# Patient Record
Sex: FEMALE | Race: BLACK OR AFRICAN AMERICAN | NOT HISPANIC OR LATINO | Employment: FULL TIME | URBAN - METROPOLITAN AREA
[De-identification: names, ages, dates, MRNs, and addresses within clinical notes are randomized per-mention and may not be internally consistent; named-entity substitution may affect disease eponyms.]

---

## 2020-11-09 ENCOUNTER — OFFICE VISIT (OUTPATIENT)
Dept: PODIATRY | Facility: CLINIC | Age: 35
End: 2020-11-09
Payer: COMMERCIAL

## 2020-11-09 ENCOUNTER — HOSPITAL ENCOUNTER (OUTPATIENT)
Dept: RADIOLOGY | Facility: HOSPITAL | Age: 35
Discharge: HOME | End: 2020-11-09
Attending: PODIATRIST
Payer: COMMERCIAL

## 2020-11-09 VITALS
SYSTOLIC BLOOD PRESSURE: 150 MMHG | HEART RATE: 90 BPM | TEMPERATURE: 98 F | RESPIRATION RATE: 18 BRPM | BODY MASS INDEX: 40.75 KG/M2 | WEIGHT: 230 LBS | DIASTOLIC BLOOD PRESSURE: 100 MMHG | HEIGHT: 63 IN

## 2020-11-09 DIAGNOSIS — M20.41 HAMMER TOE OF RIGHT FOOT: ICD-10-CM

## 2020-11-09 DIAGNOSIS — M20.41 HAMMER TOE OF RIGHT FOOT: Primary | ICD-10-CM

## 2020-11-09 PROCEDURE — 99203 PR OFFICE/OUTPT VISIT, NEW, LEVL III, 30-44 MIN: ICD-10-PCS | Mod: S$GLB,,, | Performed by: PODIATRIST

## 2020-11-09 PROCEDURE — 99999 PR PBB SHADOW E&M-NEW PATIENT-LVL IV: CPT | Mod: PBBFAC,,, | Performed by: PODIATRIST

## 2020-11-09 PROCEDURE — 73630 X-RAY EXAM OF FOOT: CPT | Mod: 26,RT,, | Performed by: RADIOLOGY

## 2020-11-09 PROCEDURE — 73630 XR FOOT COMPLETE 3 VIEW RIGHT: ICD-10-PCS | Mod: 26,RT,, | Performed by: RADIOLOGY

## 2020-11-09 PROCEDURE — 73630 X-RAY EXAM OF FOOT: CPT | Mod: TC,FY,RT

## 2020-11-09 PROCEDURE — 99999 PR PBB SHADOW E&M-NEW PATIENT-LVL IV: ICD-10-PCS | Mod: PBBFAC,,, | Performed by: PODIATRIST

## 2020-11-09 PROCEDURE — 3008F BODY MASS INDEX DOCD: CPT | Mod: S$GLB,,, | Performed by: PODIATRIST

## 2020-11-09 PROCEDURE — 99203 OFFICE O/P NEW LOW 30 MIN: CPT | Mod: S$GLB,,, | Performed by: PODIATRIST

## 2020-11-09 PROCEDURE — 3008F PR BODY MASS INDEX (BMI) DOCUMENTED: ICD-10-PCS | Mod: S$GLB,,, | Performed by: PODIATRIST

## 2020-11-09 RX ORDER — AMLODIPINE BESYLATE 10 MG/1
10 TABLET ORAL DAILY
COMMUNITY

## 2020-11-09 RX ORDER — ASPIRIN 325 MG
50000 TABLET, DELAYED RELEASE (ENTERIC COATED) ORAL
COMMUNITY
Start: 2020-08-26

## 2020-11-09 RX ORDER — FERROUS GLUCONATE 324(38)MG
324 TABLET ORAL
COMMUNITY

## 2020-11-09 RX ORDER — ALBUTEROL SULFATE 5 MG/ML
2.5 SOLUTION RESPIRATORY (INHALATION) EVERY 6 HOURS PRN
COMMUNITY

## 2020-11-09 RX ORDER — IBUPROFEN 800 MG/1
800 TABLET ORAL 3 TIMES DAILY PRN
COMMUNITY
Start: 2020-11-02

## 2020-11-09 NOTE — LETTER
November 10, 2020      Mario Fisher MD  [address] [address]  [address]  [place] [address]           [facility]  [address] [place] [address]  Phone: [phone]  Fax: [phone]          Patient: Penelope Portillo   MR Number: [id number]   YOB: 1985   Date of Visit: 11/9/2020       Dear Dr. Mario Fisher:    Thank you for referring Penelope Portillo to me for evaluation. Attached you will find relevant portions of my assessment and plan of care.    If you have questions, please do not hesitate to call me. I look forward to following Penelope Portillo along with you.    Sincerely,    Matheus Chauhan, SALTY    Enclosure  CC:  No Recipients    If you would like to receive this communication electronically, please contact [email or web address] or [phone] to request more information on [employer] Link access.    For providers and/or their staff who would like to refer a patient to [facility], please contact us through our one-stop-shop provider referral line, [facility], at [phone].    If you feel you have received this communication in error or would no longer like to receive these types of communications, please e-mail [email or web address]

## 2020-11-10 NOTE — PROGRESS NOTES
Subjective:       Patient ID: Peenlope Portillo is a 35 y.o. female.    Chief Complaint: Hammer Toe (right)   Patient presents today as a referral from primary care for a painful 3rd digit right foot.  Patient states it throbs and become swollen this has been bothering her for about a year and has gotten progressively worse she relates no trauma or injury to the area.    Past Medical History:   Diagnosis Date    Asthma     Hypertension      Past Surgical History:   Procedure Laterality Date    MYOMECTOMY       Family History   Problem Relation Age of Onset    Asthma Mother      Social History     Socioeconomic History    Marital status: Single     Spouse name: Not on file    Number of children: Not on file    Years of education: Not on file    Highest education level: Not on file   Occupational History    Not on file   Social Needs    Financial resource strain: Not on file    Food insecurity     Worry: Not on file     Inability: Not on file    Transportation needs     Medical: Not on file     Non-medical: Not on file   Tobacco Use    Smoking status: Never Smoker    Smokeless tobacco: Never Used   Substance and Sexual Activity    Alcohol use: Yes     Frequency: 2-4 times a month    Drug use: Never    Sexual activity: Yes     Partners: Male   Lifestyle    Physical activity     Days per week: Not on file     Minutes per session: Not on file    Stress: Not on file   Relationships    Social connections     Talks on phone: Not on file     Gets together: Not on file     Attends Cheondoism service: Not on file     Active member of club or organization: Not on file     Attends meetings of clubs or organizations: Not on file     Relationship status: Not on file   Other Topics Concern    Not on file   Social History Narrative    Not on file       Current Outpatient Medications   Medication Sig Dispense Refill    albuterol (PROVENTIL) 5 mg/mL nebulizer solution Take 2.5 mg by nebulization every 6 (six) hours  "as needed for Wheezing. Rescue      amLODIPine (NORVASC) 10 MG tablet Take 10 mg by mouth once daily.      ferrous gluconate (FERGON) 324 MG tablet Take 324 mg by mouth daily with breakfast.      cholecalciferol, vitamin D3, 1,250 mcg (50,000 unit) capsule Take 50,000 Units by mouth every 7 days.      ibuprofen (ADVIL,MOTRIN) 800 MG tablet Take 800 mg by mouth 3 (three) times daily as needed.       No current facility-administered medications for this visit.      Review of patient's allergies indicates:  No Known Allergies    Review of Systems   Musculoskeletal: Positive for arthralgias and joint swelling.   All other systems reviewed and are negative.      Objective:      Vitals:    11/09/20 0813   BP: (!) 150/100   BP Location: Left arm   Patient Position: Sitting   Pulse: 90   Resp: 18   Temp: 98.1 °F (36.7 °C)   TempSrc: Oral   Weight: 104.3 kg (230 lb)   Height: 5' 3" (1.6 m)     Physical Exam  Vitals signs and nursing note reviewed.   Constitutional:       Appearance: Normal appearance.   Cardiovascular:      Pulses:           Dorsalis pedis pulses are 2+ on the right side and 2+ on the left side.        Posterior tibial pulses are 2+ on the right side and 2+ on the left side.   Pulmonary:      Effort: Pulmonary effort is normal.   Musculoskeletal:         General: Swelling, tenderness and deformity present.      Right foot: Decreased range of motion. Deformity present.        Feet:    Feet:      Right foot:      Protective Sensation: 2 sites tested. 2 sites sensed.      Left foot:      Protective Sensation: 2 sites tested. 2 sites sensed.   Skin:     General: Skin is warm.      Capillary Refill: Capillary refill takes less than 2 seconds.   Neurological:      General: No focal deficit present.      Mental Status: She is alert.   Psychiatric:         Mood and Affect: Mood normal.         Behavior: Behavior normal.         Thought Content: Thought content normal.         Judgment: Judgment normal. "                      Assessment:       1. Hammer toe of right foot        Plan:       Patient presents today as a referral from primary care for a painful 3rd digit right foot.  Patient states it throbs and become swollen this has been bothering her for about a year and has gotten progressively worse she relates no trauma or injury to the area.  On evaluation today x-rays were evaluated of the patient's right foot patient is noted have digital contractures however the most severely noted is the distal interphalangeal joint 3rd digit right this is noted to be grossly contracted while not fractured nor dislocated the joint itself is contracted.  On manipulation patient's digital contracture at the distal 3rd digit right is noted to be rigid in nature non reducible the majority of the deformity was at the distal interphalangeal joint the proximal interphalangeal joint was reducible upon manipulation there is also some degree of arthritic changes in the distal interphalangeal joint 3rd digit right.  Patient advised there is some degree of digital contracture in the other digits noted on plain film x-ray however not as severe as the 3rd digit obviously this is causing the patient significant discomfort because of the contracture when she walks she is putting pressure on the tip of the 3rd digit rather than the underside or plantar aspect of the 3rd digit.  I did advised the patient what would need to be done surgically to address this she indicates this has been bothering for her for year she needs to do something about it I did discuss an arthrodesis of the distal and proximal interphalangeal joint 3rd digit advising her this was be an outpatient procedure with nonweightbearing for a couple of days followed by weight-bearing in a fracture boot for likely 3-4 weeks while the area is healing patient was in understanding and agreement with this all aspects of surgical intervention discussed patient advised to think about  this carefully obviously there is no rush in doing this whether it is done our year from now it will likely be the same procedure it just depends on how much it is bothering her and causing her discomfort at this point.  Patient was advised this will likely progressively get worse over time.  Patient is going to consider surgical options discussed today there were really no good conservative options for the patient she has tried different types of pads and she states things seem to just irritate this area worse.  Follow-up as needed patient advised to contact us with any questions problems concerns or should she further wish to discuss surgical intervention.  Face-to-face time including discussion evaluation treatment options surgical options review of x-rays equaled 30 min.This note was created using [employer] voice recognition software that occasionally misinterpreted phrases or words.

## 2020-11-24 ENCOUNTER — TELEPHONE (OUTPATIENT)
Dept: PODIATRY | Facility: CLINIC | Age: 35
End: 2020-11-24

## 2020-11-24 DIAGNOSIS — M20.41 HAMMER TOE OF RIGHT FOOT: Primary | ICD-10-CM

## 2020-11-24 NOTE — TELEPHONE ENCOUNTER
Patient would like to schedule surgery 12/4/2020 due to her being off work until 12/30/2020 and this would allow her time to heal from surgery. 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 If so I will schedule pre op on 11/30/2020

## 2020-11-24 NOTE — TELEPHONE ENCOUNTER
----- Message from Elijah Ramos sent at 11/24/2020 10:42 AM CST -----  Contact: pt at  [phone]  Type: Needs Medical Advice  Who Called:  pt  Best Call Back Number: [phone]  Additional Information: pt is calling to schedule surgery. Please call back and advise

## 2020-11-30 ENCOUNTER — OFFICE VISIT (OUTPATIENT)
Dept: PODIATRY | Facility: CLINIC | Age: 35
End: 2020-11-30
Payer: COMMERCIAL

## 2020-11-30 VITALS
WEIGHT: 227 LBS | HEART RATE: 93 BPM | BODY MASS INDEX: 40.22 KG/M2 | DIASTOLIC BLOOD PRESSURE: 96 MMHG | SYSTOLIC BLOOD PRESSURE: 137 MMHG | TEMPERATURE: 98 F | HEIGHT: 63 IN

## 2020-11-30 DIAGNOSIS — Z01.818 PRE-OP EXAM: ICD-10-CM

## 2020-11-30 DIAGNOSIS — M20.41 HAMMER TOE OF RIGHT FOOT: Primary | ICD-10-CM

## 2020-11-30 PROCEDURE — 3008F BODY MASS INDEX DOCD: CPT | Mod: S$GLB,,, | Performed by: PODIATRIST

## 2020-11-30 PROCEDURE — 99999 PR PBB SHADOW E&M-EST. PATIENT-LVL IV: CPT | Mod: PBBFAC,,, | Performed by: PODIATRIST

## 2020-11-30 PROCEDURE — 99214 PR OFFICE/OUTPT VISIT, EST, LEVL IV, 30-39 MIN: ICD-10-PCS | Mod: S$GLB,,, | Performed by: PODIATRIST

## 2020-11-30 PROCEDURE — 1126F AMNT PAIN NOTED NONE PRSNT: CPT | Mod: S$GLB,,, | Performed by: PODIATRIST

## 2020-11-30 PROCEDURE — 1126F PR PAIN SEVERITY QUANTIFIED, NO PAIN PRESENT: ICD-10-PCS | Mod: S$GLB,,, | Performed by: PODIATRIST

## 2020-11-30 PROCEDURE — 99999 PR PBB SHADOW E&M-EST. PATIENT-LVL IV: ICD-10-PCS | Mod: PBBFAC,,, | Performed by: PODIATRIST

## 2020-11-30 PROCEDURE — 99214 OFFICE O/P EST MOD 30 MIN: CPT | Mod: S$GLB,,, | Performed by: PODIATRIST

## 2020-11-30 PROCEDURE — 3008F PR BODY MASS INDEX (BMI) DOCUMENTED: ICD-10-PCS | Mod: S$GLB,,, | Performed by: PODIATRIST

## 2020-11-30 RX ORDER — HYDROCODONE BITARTRATE AND ACETAMINOPHEN 5; 325 MG/1; MG/1
TABLET ORAL
COMMUNITY
Start: 2020-11-13

## 2020-11-30 RX ORDER — SULFAMETHOXAZOLE AND TRIMETHOPRIM 400; 80 MG/1; MG/1
2 TABLET ORAL 2 TIMES DAILY
Qty: 56 TABLET | Refills: 0 | Status: SHIPPED | OUTPATIENT
Start: 2020-11-30 | End: 2020-12-14

## 2020-11-30 RX ORDER — OXYCODONE AND ACETAMINOPHEN 5; 325 MG/1; MG/1
2 TABLET ORAL
Qty: 60 TABLET | Refills: 0 | Status: SHIPPED | OUTPATIENT
Start: 2020-11-30 | End: 2020-12-05

## 2020-11-30 RX ORDER — ONDANSETRON HYDROCHLORIDE 8 MG/1
8 TABLET, FILM COATED ORAL EVERY 8 HOURS PRN
Qty: 42 TABLET | Refills: 1 | Status: SHIPPED | OUTPATIENT
Start: 2020-11-30 | End: 2020-12-14

## 2020-11-30 NOTE — PATIENT INSTRUCTIONS
Nothing to eat or drink after midnight.  If you take medication for high blood pressure take this in the morning with a sip of water prior to surgery.     Arrive at out patient registration at 8:30 am

## 2020-12-01 ENCOUNTER — LAB VISIT (OUTPATIENT)
Dept: FAMILY MEDICINE | Facility: CLINIC | Age: 35
End: 2020-12-01
Payer: COMMERCIAL

## 2020-12-01 ENCOUNTER — HOSPITAL ENCOUNTER (OUTPATIENT)
Dept: PREADMISSION TESTING | Facility: HOSPITAL | Age: 35
Discharge: HOME | End: 2020-12-01
Attending: PODIATRIST
Payer: COMMERCIAL

## 2020-12-01 ENCOUNTER — ANESTHESIA EVENT (OUTPATIENT)
Dept: SURGERY | Facility: HOSPITAL | Age: 35
End: 2020-12-01
Payer: COMMERCIAL

## 2020-12-01 DIAGNOSIS — Z01.818 PRE-OP EXAM: ICD-10-CM

## 2020-12-01 PROCEDURE — U0003 INFECTIOUS AGENT DETECTION BY NUCLEIC ACID (DNA OR RNA); SEVERE ACUTE RESPIRATORY SYNDROME CORONAVIRUS 2 (SARS-COV-2) (CORONAVIRUS DISEASE [COVID-19]), AMPLIFIED PROBE TECHNIQUE, MAKING USE OF HIGH THROUGHPUT TECHNOLOGIES AS DESCRIBED BY CMS-2020-01-R: HCPCS

## 2020-12-01 PROCEDURE — 99900103 DSU ONLY-NO CHARGE-INITIAL HR (STAT)

## 2020-12-01 NOTE — H&P (VIEW-ONLY)
Subjective:       Patient ID: Penelope Portillo is a 35 y.o. female.    Chief Complaint: Pre-op Exam (3rd digit right foot ), Foot Problem, and Follow-up   Patient presents today as a referral from primary care for a painful 3rd digit right foot.  Patient states it throbs and become swollen this has been bothering her for about a year and has gotten progressively worse she relates no trauma or injury to the area.  Patient presents today for surgical consultation regarding the correction of the 3rd digit right foot.    Past Medical History:   Diagnosis Date    Asthma     Hypertension      Past Surgical History:   Procedure Laterality Date    MYOMECTOMY       Family History   Problem Relation Age of Onset    Asthma Mother      Social History     Socioeconomic History    Marital status: Single     Spouse name: Not on file    Number of children: Not on file    Years of education: Not on file    Highest education level: Not on file   Occupational History    Not on file   Social Needs    Financial resource strain: Not on file    Food insecurity     Worry: Not on file     Inability: Not on file    Transportation needs     Medical: Not on file     Non-medical: Not on file   Tobacco Use    Smoking status: Never Smoker    Smokeless tobacco: Never Used   Substance and Sexual Activity    Alcohol use: Yes     Frequency: 2-4 times a month    Drug use: Never    Sexual activity: Yes     Partners: Male   Lifestyle    Physical activity     Days per week: Not on file     Minutes per session: Not on file    Stress: Not on file   Relationships    Social connections     Talks on phone: Not on file     Gets together: Not on file     Attends Mandaen service: Not on file     Active member of club or organization: Not on file     Attends meetings of clubs or organizations: Not on file     Relationship status: Not on file   Other Topics Concern    Not on file   Social History Narrative    Not on file       Current  "Outpatient Medications   Medication Sig Dispense Refill    albuterol (PROVENTIL) 5 mg/mL nebulizer solution Take 2.5 mg by nebulization every 6 (six) hours as needed for Wheezing. Rescue      amLODIPine (NORVASC) 10 MG tablet Take 10 mg by mouth once daily.      cholecalciferol, vitamin D3, 1,250 mcg (50,000 unit) capsule Take 50,000 Units by mouth every 7 days.      ferrous gluconate (FERGON) 324 MG tablet Take 324 mg by mouth daily with breakfast.      HYDROcodone-acetaminophen (NORCO) 5-325 mg per tablet TAKE ONE TABLET BY MOUTH EVERY 6 HOURS AS NEEDED FOR moderate PAIN      ibuprofen (ADVIL,MOTRIN) 800 MG tablet Take 800 mg by mouth 3 (three) times daily as needed.      ondansetron (ZOFRAN) 8 MG tablet Take 1 tablet (8 mg total) by mouth every 8 (eight) hours as needed for Nausea. 42 tablet 1    oxyCODONE-acetaminophen (PERCOCET) 5-325 mg per tablet Take 2 tablets by mouth 6 (six) times daily. for 5 days 60 tablet 0    sulfamethoxazole-trimethoprim 400-80mg (BACTRIM,SEPTRA) 400-80 mg per tablet Take 2 tablets by mouth 2 (two) times daily. for 14 days 56 tablet 0     No current facility-administered medications for this visit.      Review of patient's allergies indicates:  No Known Allergies    Review of Systems   Musculoskeletal: Positive for arthralgias and joint swelling.   All other systems reviewed and are negative.      Objective:      Vitals:    11/30/20 1526   BP: (!) 137/96   Pulse: 93   Temp: 97.6 °F (36.4 °C)   Weight: 103 kg (227 lb)   Height: 5' 3" (1.6 m)     Physical Exam  Vitals signs and nursing note reviewed.   Constitutional:       Appearance: Normal appearance.   Cardiovascular:      Rate and Rhythm: Normal rate and regular rhythm.      Pulses: Normal pulses.           Dorsalis pedis pulses are 2+ on the right side and 2+ on the left side.        Posterior tibial pulses are 2+ on the right side and 2+ on the left side.      Heart sounds: Normal heart sounds.   Pulmonary:      Effort: " Pulmonary effort is normal.      Breath sounds: Normal breath sounds.   Musculoskeletal:         General: Swelling, tenderness and deformity present.      Right foot: Decreased range of motion. Deformity present.        Feet:    Feet:      Right foot:      Protective Sensation: 2 sites tested. 2 sites sensed.      Left foot:      Protective Sensation: 2 sites tested. 2 sites sensed.   Skin:     General: Skin is warm.      Capillary Refill: Capillary refill takes less than 2 seconds.   Neurological:      General: No focal deficit present.      Mental Status: She is alert.   Psychiatric:         Mood and Affect: Mood normal.         Behavior: Behavior normal.         Thought Content: Thought content normal.         Judgment: Judgment normal.                              Assessment:       1. Hammer toe of right foot    2. Pre-op exam        Plan:       Patient presents today as a referral from primary care for a painful 3rd digit right foot.  Patient states it throbs and become swollen this has been bothering her for about a year and has gotten progressively worse she relates no trauma or injury to the area.  On evaluation today x-rays were evaluated of the patient's right foot patient is noted have digital contractures however the most severely noted is the distal interphalangeal joint 3rd digit right this is noted to be grossly contracted while not fractured nor dislocated the joint itself is contracted.  On manipulation patient's digital contracture at the distal 3rd digit right is noted to be rigid in nature non reducible the majority of the deformity was at the distal interphalangeal joint the proximal interphalangeal joint was reducible upon manipulation there is also some degree of arthritic changes in the distal interphalangeal joint 3rd digit right.  Patient advised there is some degree of digital contracture in the other digits noted on plain film x-ray however not as severe as the 3rd digit obviously this is  causing the patient significant discomfort because of the contracture when she walks she is putting pressure on the tip of the 3rd digit rather than the underside or plantar aspect of the 3rd digit.  I did advised the patient what would need to be done surgically to address this she indicates this has been bothering for her for year she needs to do something about it I did discuss an arthrodesis of the distal and proximal interphalangeal joint 3rd digit advising her this was be an outpatient procedure with nonweightbearing for a couple of days followed by weight-bearing in a fracture boot for likely 3-4 weeks while the area is healing patient was in understanding and agreement with this all aspects of surgical intervention discussed.  Patient may decision to pursue surgery today following discussion.  Patient presents today for preoperative history and physical in discussion all aspects of surgery were discussed with the patient no guarantees were given written or implied all potential complications including but not limited to delayed healing nonhealing postoperative pain infection recurrence were discussed with the patient in detail. All aspect of the patient's recovery time involved in recovery patient responsibility is involving recovery were also discussed in detail. Patient advised failure to comply with postoperative care will jeopardize surgical outcome.  All aspects of surgical intervention discussed at length and in detail patient signed consent form agreeing to surgical correction 3rd digit right.  Patient was dispensed prescriptions for Percocet Zofran and Bactrim preoperative lab work and a COVID-19 test will be performed and evaluated prior to surgery I did give the patient a prescription for a fracture boot which she will be required to wear after surgery to protect the site of surgical correction.  Patient understands the 1st 72 hr she needs to take it easy ice elevate stay off her foot as much as  possible it is very important to control the swelling in the digits following surgery so that 3-4 weeks postop she will have a lot less swelling and be okay to transition into normal shoes.  Patient's surgery has been scheduled for December 4, 2020 she will be NPO after midnight other than taking her blood pressure medicine and has been advised to contact us with any problems questions or concerns prior to surgery.  Pre-admission testing has been scheduled for the patient.  Total face-to-face time including discussion evaluation preoperative exam surgical consultation decision for surgery and review of surgical procedure in detail equaled 30 min.This note was created using [employer] voice recognition software that occasionally misinterpreted phrases or words.  This note was created using [employer] voice recognition software that occasionally misinterpreted phrases or words.

## 2020-12-01 NOTE — PRE-PROCEDURE INSTRUCTIONS
PAT done. Pt ambulatory, alert and oriented. Discussed pre, postop, and discharge instructions. hibicleanse given with instructions. Npo after mn. Need . Arrive at main entrance.   Labs and covid today. Consult with saskia done. Pt escorted to lab. No questions or concerns at this time. Will call day before surgery with arrival time.

## 2020-12-01 NOTE — PROGRESS NOTES
Subjective:       Patient ID: Penelope Portillo is a 35 y.o. female.    Chief Complaint: Pre-op Exam (3rd digit right foot ), Foot Problem, and Follow-up   Patient presents today as a referral from primary care for a painful 3rd digit right foot.  Patient states it throbs and become swollen this has been bothering her for about a year and has gotten progressively worse she relates no trauma or injury to the area.  Patient presents today for surgical consultation regarding the correction of the 3rd digit right foot.    Past Medical History:   Diagnosis Date    Asthma     Hypertension      Past Surgical History:   Procedure Laterality Date    MYOMECTOMY       Family History   Problem Relation Age of Onset    Asthma Mother      Social History     Socioeconomic History    Marital status: Single     Spouse name: Not on file    Number of children: Not on file    Years of education: Not on file    Highest education level: Not on file   Occupational History    Not on file   Social Needs    Financial resource strain: Not on file    Food insecurity     Worry: Not on file     Inability: Not on file    Transportation needs     Medical: Not on file     Non-medical: Not on file   Tobacco Use    Smoking status: Never Smoker    Smokeless tobacco: Never Used   Substance and Sexual Activity    Alcohol use: Yes     Frequency: 2-4 times a month    Drug use: Never    Sexual activity: Yes     Partners: Male   Lifestyle    Physical activity     Days per week: Not on file     Minutes per session: Not on file    Stress: Not on file   Relationships    Social connections     Talks on phone: Not on file     Gets together: Not on file     Attends Mandaeism service: Not on file     Active member of club or organization: Not on file     Attends meetings of clubs or organizations: Not on file     Relationship status: Not on file   Other Topics Concern    Not on file   Social History Narrative    Not on file       Current  "Outpatient Medications   Medication Sig Dispense Refill    albuterol (PROVENTIL) 5 mg/mL nebulizer solution Take 2.5 mg by nebulization every 6 (six) hours as needed for Wheezing. Rescue      amLODIPine (NORVASC) 10 MG tablet Take 10 mg by mouth once daily.      cholecalciferol, vitamin D3, 1,250 mcg (50,000 unit) capsule Take 50,000 Units by mouth every 7 days.      ferrous gluconate (FERGON) 324 MG tablet Take 324 mg by mouth daily with breakfast.      HYDROcodone-acetaminophen (NORCO) 5-325 mg per tablet TAKE ONE TABLET BY MOUTH EVERY 6 HOURS AS NEEDED FOR moderate PAIN      ibuprofen (ADVIL,MOTRIN) 800 MG tablet Take 800 mg by mouth 3 (three) times daily as needed.      ondansetron (ZOFRAN) 8 MG tablet Take 1 tablet (8 mg total) by mouth every 8 (eight) hours as needed for Nausea. 42 tablet 1    oxyCODONE-acetaminophen (PERCOCET) 5-325 mg per tablet Take 2 tablets by mouth 6 (six) times daily. for 5 days 60 tablet 0    sulfamethoxazole-trimethoprim 400-80mg (BACTRIM,SEPTRA) 400-80 mg per tablet Take 2 tablets by mouth 2 (two) times daily. for 14 days 56 tablet 0     No current facility-administered medications for this visit.      Review of patient's allergies indicates:  No Known Allergies    Review of Systems   Musculoskeletal: Positive for arthralgias and joint swelling.   All other systems reviewed and are negative.      Objective:      Vitals:    11/30/20 1526   BP: (!) 137/96   Pulse: 93   Temp: 97.6 °F (36.4 °C)   Weight: 103 kg (227 lb)   Height: 5' 3" (1.6 m)     Physical Exam  Vitals signs and nursing note reviewed.   Constitutional:       Appearance: Normal appearance.   Cardiovascular:      Rate and Rhythm: Normal rate and regular rhythm.      Pulses: Normal pulses.           Dorsalis pedis pulses are 2+ on the right side and 2+ on the left side.        Posterior tibial pulses are 2+ on the right side and 2+ on the left side.      Heart sounds: Normal heart sounds.   Pulmonary:      Effort: " Pulmonary effort is normal.      Breath sounds: Normal breath sounds.   Musculoskeletal:         General: Swelling, tenderness and deformity present.      Right foot: Decreased range of motion. Deformity present.        Feet:    Feet:      Right foot:      Protective Sensation: 2 sites tested. 2 sites sensed.      Left foot:      Protective Sensation: 2 sites tested. 2 sites sensed.   Skin:     General: Skin is warm.      Capillary Refill: Capillary refill takes less than 2 seconds.   Neurological:      General: No focal deficit present.      Mental Status: She is alert.   Psychiatric:         Mood and Affect: Mood normal.         Behavior: Behavior normal.         Thought Content: Thought content normal.         Judgment: Judgment normal.                              Assessment:       1. Hammer toe of right foot    2. Pre-op exam        Plan:       Patient presents today as a referral from primary care for a painful 3rd digit right foot.  Patient states it throbs and become swollen this has been bothering her for about a year and has gotten progressively worse she relates no trauma or injury to the area.  On evaluation today x-rays were evaluated of the patient's right foot patient is noted have digital contractures however the most severely noted is the distal interphalangeal joint 3rd digit right this is noted to be grossly contracted while not fractured nor dislocated the joint itself is contracted.  On manipulation patient's digital contracture at the distal 3rd digit right is noted to be rigid in nature non reducible the majority of the deformity was at the distal interphalangeal joint the proximal interphalangeal joint was reducible upon manipulation there is also some degree of arthritic changes in the distal interphalangeal joint 3rd digit right.  Patient advised there is some degree of digital contracture in the other digits noted on plain film x-ray however not as severe as the 3rd digit obviously this is  causing the patient significant discomfort because of the contracture when she walks she is putting pressure on the tip of the 3rd digit rather than the underside or plantar aspect of the 3rd digit.  I did advised the patient what would need to be done surgically to address this she indicates this has been bothering for her for year she needs to do something about it I did discuss an arthrodesis of the distal and proximal interphalangeal joint 3rd digit advising her this was be an outpatient procedure with nonweightbearing for a couple of days followed by weight-bearing in a fracture boot for likely 3-4 weeks while the area is healing patient was in understanding and agreement with this all aspects of surgical intervention discussed.  Patient may decision to pursue surgery today following discussion.  Patient presents today for preoperative history and physical in discussion all aspects of surgery were discussed with the patient no guarantees were given written or implied all potential complications including but not limited to delayed healing nonhealing postoperative pain infection recurrence were discussed with the patient in detail. All aspect of the patient's recovery time involved in recovery patient responsibility is involving recovery were also discussed in detail. Patient advised failure to comply with postoperative care will jeopardize surgical outcome.  All aspects of surgical intervention discussed at length and in detail patient signed consent form agreeing to surgical correction 3rd digit right.  Patient was dispensed prescriptions for Percocet Zofran and Bactrim preoperative lab work and a COVID-19 test will be performed and evaluated prior to surgery I did give the patient a prescription for a fracture boot which she will be required to wear after surgery to protect the site of surgical correction.  Patient understands the 1st 72 hr she needs to take it easy ice elevate stay off her foot as much as  possible it is very important to control the swelling in the digits following surgery so that 3-4 weeks postop she will have a lot less swelling and be okay to transition into normal shoes.  Patient's surgery has been scheduled for December 4, 2020 she will be NPO after midnight other than taking her blood pressure medicine and has been advised to contact us with any problems questions or concerns prior to surgery.  Pre-admission testing has been scheduled for the patient.  Total face-to-face time including discussion evaluation preoperative exam surgical consultation decision for surgery and review of surgical procedure in detail equaled 30 min.This note was created using M*Modal voice recognition software that occasionally misinterpreted phrases or words.  This note was created using M*Modal voice recognition software that occasionally misinterpreted phrases or words.

## 2020-12-01 NOTE — ANESTHESIA PREPROCEDURE EVALUATION
12/01/2020  Penelope Portillo is a 35 y.o., female.    Anesthesia Evaluation    I have reviewed the Patient Summary Reports.    I have reviewed the Nursing Notes. I have reviewed the NPO Status.   I have reviewed the Medications.     Review of Systems  Hematology/Oncology:  Hematology Normal   Oncology Normal     EENT/Dental:EENT/Dental Normal   Cardiovascular:   Hypertension    Pulmonary:   Asthma    Renal/:  Renal/ Normal     Hepatic/GI:  Hepatic/GI Normal    Musculoskeletal:  Musculoskeletal Normal    Neurological:  Neurology Normal    Endocrine:  Endocrine Normal    Dermatological:  Skin Normal    Psych:  Psychiatric Normal           Physical Exam  General:  Well nourished, Obesity, Morbid Obesity    Airway/Jaw/Neck:  Airway Findings: Mouth Opening: Normal Tongue: Normal  General Airway Assessment: Adult  Mallampati: II  TM Distance: Normal, at least 6 cm      Dental:  Dental Findings: Lower braces, Upper braces   Chest/Lungs:  Chest/Lungs Findings: Clear to auscultation     Heart/Vascular:  Heart Findings:       Mental Status:  Mental Status Findings:  Alert and Oriented, Cooperative         Anesthesia Plan  Type of Anesthesia, risks & benefits discussed:  Anesthesia Type:  general  Patient's Preference:   Intra-op Monitoring Plan: standard ASA monitors  Intra-op Monitoring Plan Comments:   Post Op Pain Control Plan: IV/PO Opioids PRN  Post Op Pain Control Plan Comments:   Induction:   IV  Beta Blocker:  Patient is not currently on a Beta-Blocker (No further documentation required).       Informed Consent: Patient understands risks and agrees with Anesthesia plan.  Questions answered. Anesthesia consent signed with patient.  ASA Score: 2     Day of Surgery Review of History & Physical: I have interviewed and examined the patient. I have reviewed the patient's H&P dated:            Ready For Surgery  From Anesthesia Perspective.

## 2020-12-02 LAB — SARS-COV-2 RNA RESP QL NAA+PROBE: NOT DETECTED

## 2020-12-02 RX ORDER — SODIUM CHLORIDE, SODIUM LACTATE, POTASSIUM CHLORIDE, CALCIUM CHLORIDE 600; 310; 30; 20 MG/100ML; MG/100ML; MG/100ML; MG/100ML
INJECTION, SOLUTION INTRAVENOUS CONTINUOUS
Status: CANCELLED | OUTPATIENT
Start: 2020-12-04

## 2020-12-03 ENCOUNTER — PATIENT MESSAGE (OUTPATIENT)
Dept: SURGERY | Facility: HOSPITAL | Age: 35
End: 2020-12-03

## 2020-12-03 DIAGNOSIS — M20.41 HAMMER TOE OF RIGHT FOOT: Primary | ICD-10-CM

## 2020-12-03 NOTE — TELEPHONE ENCOUNTER
Patient advised she did not need a scooter she would be weight bearing after surgery. Patient verbalized understanding

## 2020-12-04 ENCOUNTER — HOSPITAL ENCOUNTER (OUTPATIENT)
Facility: HOSPITAL | Age: 35
Discharge: HOME | End: 2020-12-04
Attending: PODIATRIST | Admitting: PODIATRIST
Payer: COMMERCIAL

## 2020-12-04 ENCOUNTER — ANESTHESIA (OUTPATIENT)
Dept: SURGERY | Facility: HOSPITAL | Age: 35
End: 2020-12-04
Payer: COMMERCIAL

## 2020-12-04 VITALS
DIASTOLIC BLOOD PRESSURE: 84 MMHG | HEART RATE: 90 BPM | RESPIRATION RATE: 17 BRPM | BODY MASS INDEX: 40.21 KG/M2 | OXYGEN SATURATION: 100 % | HEIGHT: 63 IN | SYSTOLIC BLOOD PRESSURE: 129 MMHG | TEMPERATURE: 98 F

## 2020-12-04 DIAGNOSIS — M20.41 HAMMER TOE OF RIGHT FOOT: ICD-10-CM

## 2020-12-04 DIAGNOSIS — Z01.818 PRE-OP EXAM: ICD-10-CM

## 2020-12-04 LAB — B-HCG UR QL: NEGATIVE

## 2020-12-04 PROCEDURE — 28285 REPAIR OF HAMMERTOE: CPT | Mod: T7,,, | Performed by: PODIATRIST

## 2020-12-04 PROCEDURE — 36000706: Performed by: PODIATRIST

## 2020-12-04 PROCEDURE — 71000033 HC RECOVERY, INTIAL HOUR: Performed by: PODIATRIST

## 2020-12-04 PROCEDURE — C1769 GUIDE WIRE: HCPCS | Performed by: PODIATRIST

## 2020-12-04 PROCEDURE — D9220A PRA ANESTHESIA: ICD-10-PCS | Mod: ,,, | Performed by: ANESTHESIOLOGY

## 2020-12-04 PROCEDURE — 25000003 PHARM REV CODE 250: Performed by: PODIATRIST

## 2020-12-04 PROCEDURE — 63600175 PHARM REV CODE 636 W HCPCS: Performed by: ANESTHESIOLOGY

## 2020-12-04 PROCEDURE — 81025 URINE PREGNANCY TEST: CPT

## 2020-12-04 PROCEDURE — 63600175 PHARM REV CODE 636 W HCPCS: Performed by: NURSE ANESTHETIST, CERTIFIED REGISTERED

## 2020-12-04 PROCEDURE — C1713 ANCHOR/SCREW BN/BN,TIS/BN: HCPCS | Performed by: PODIATRIST

## 2020-12-04 PROCEDURE — 27201423 OPTIME MED/SURG SUP & DEVICES STERILE SUPPLY: Performed by: PODIATRIST

## 2020-12-04 PROCEDURE — D9220A PRA ANESTHESIA: Mod: ,,, | Performed by: ANESTHESIOLOGY

## 2020-12-04 PROCEDURE — 37000009 HC ANESTHESIA EA ADD 15 MINS: Performed by: PODIATRIST

## 2020-12-04 PROCEDURE — 36000707: Performed by: PODIATRIST

## 2020-12-04 PROCEDURE — 71000015 HC POSTOP RECOV 1ST HR: Performed by: PODIATRIST

## 2020-12-04 PROCEDURE — 63600175 PHARM REV CODE 636 W HCPCS: Performed by: PODIATRIST

## 2020-12-04 PROCEDURE — 25000003 PHARM REV CODE 250

## 2020-12-04 PROCEDURE — 37000008 HC ANESTHESIA 1ST 15 MINUTES: Performed by: PODIATRIST

## 2020-12-04 PROCEDURE — 25000003 PHARM REV CODE 250: Performed by: NURSE ANESTHETIST, CERTIFIED REGISTERED

## 2020-12-04 PROCEDURE — 28285 PR REPAIR OF HAMMERTOE,ONE: ICD-10-PCS | Mod: T7,,, | Performed by: PODIATRIST

## 2020-12-04 DEVICE — IMPLANTABLE DEVICE: Type: IMPLANTABLE DEVICE | Site: TOE | Status: FUNCTIONAL

## 2020-12-04 RX ORDER — ONDANSETRON 2 MG/ML
INJECTION INTRAMUSCULAR; INTRAVENOUS
Status: DISCONTINUED | OUTPATIENT
Start: 2020-12-04 | End: 2020-12-04

## 2020-12-04 RX ORDER — EPHEDRINE SULFATE 50 MG/ML
INJECTION, SOLUTION INTRAVENOUS
Status: DISCONTINUED | OUTPATIENT
Start: 2020-12-04 | End: 2020-12-04

## 2020-12-04 RX ORDER — ONDANSETRON 2 MG/ML
4 INJECTION INTRAMUSCULAR; INTRAVENOUS DAILY PRN
Status: DISCONTINUED | OUTPATIENT
Start: 2020-12-04 | End: 2020-12-04 | Stop reason: HOSPADM

## 2020-12-04 RX ORDER — PROPOFOL 10 MG/ML
VIAL (ML) INTRAVENOUS
Status: DISCONTINUED | OUTPATIENT
Start: 2020-12-04 | End: 2020-12-04

## 2020-12-04 RX ORDER — LIDOCAINE HYDROCHLORIDE 10 MG/ML
1 INJECTION, SOLUTION EPIDURAL; INFILTRATION; INTRACAUDAL; PERINEURAL ONCE
Status: DISCONTINUED | OUTPATIENT
Start: 2020-12-04 | End: 2020-12-04 | Stop reason: HOSPADM

## 2020-12-04 RX ORDER — CEFAZOLIN SODIUM 2 G/50ML
2 SOLUTION INTRAVENOUS
Status: COMPLETED | OUTPATIENT
Start: 2020-12-04 | End: 2020-12-04

## 2020-12-04 RX ORDER — SODIUM CHLORIDE, SODIUM LACTATE, POTASSIUM CHLORIDE, CALCIUM CHLORIDE 600; 310; 30; 20 MG/100ML; MG/100ML; MG/100ML; MG/100ML
INJECTION, SOLUTION INTRAVENOUS CONTINUOUS
Status: DISCONTINUED | OUTPATIENT
Start: 2020-12-04 | End: 2020-12-04 | Stop reason: HOSPADM

## 2020-12-04 RX ORDER — MORPHINE SULFATE 4 MG/ML
INJECTION, SOLUTION INTRAMUSCULAR; INTRAVENOUS
Status: DISCONTINUED
Start: 2020-12-04 | End: 2020-12-04 | Stop reason: WASHOUT

## 2020-12-04 RX ORDER — LIDOCAINE HYDROCHLORIDE 10 MG/ML
INJECTION INFILTRATION; PERINEURAL
Status: DISCONTINUED | OUTPATIENT
Start: 2020-12-04 | End: 2020-12-04 | Stop reason: HOSPADM

## 2020-12-04 RX ORDER — MIDAZOLAM HYDROCHLORIDE 1 MG/ML
INJECTION INTRAMUSCULAR; INTRAVENOUS
Status: DISCONTINUED | OUTPATIENT
Start: 2020-12-04 | End: 2020-12-04

## 2020-12-04 RX ORDER — SODIUM CHLORIDE, SODIUM LACTATE, POTASSIUM CHLORIDE, CALCIUM CHLORIDE 600; 310; 30; 20 MG/100ML; MG/100ML; MG/100ML; MG/100ML
125 INJECTION, SOLUTION INTRAVENOUS CONTINUOUS
Status: DISCONTINUED | OUTPATIENT
Start: 2020-12-04 | End: 2020-12-04 | Stop reason: HOSPADM

## 2020-12-04 RX ORDER — BUPIVACAINE HYDROCHLORIDE 5 MG/ML
INJECTION, SOLUTION PERINEURAL
Status: DISCONTINUED | OUTPATIENT
Start: 2020-12-04 | End: 2020-12-04 | Stop reason: HOSPADM

## 2020-12-04 RX ORDER — SODIUM CHLORIDE, SODIUM LACTATE, POTASSIUM CHLORIDE, CALCIUM CHLORIDE 600; 310; 30; 20 MG/100ML; MG/100ML; MG/100ML; MG/100ML
INJECTION, SOLUTION INTRAVENOUS CONTINUOUS PRN
Status: DISCONTINUED | OUTPATIENT
Start: 2020-12-04 | End: 2020-12-04

## 2020-12-04 RX ORDER — FAMOTIDINE 10 MG/ML
INJECTION INTRAVENOUS
Status: COMPLETED
Start: 2020-12-04 | End: 2020-12-04

## 2020-12-04 RX ORDER — LIDOCAINE HYDROCHLORIDE 20 MG/ML
INJECTION, SOLUTION EPIDURAL; INFILTRATION; INTRACAUDAL; PERINEURAL
Status: DISCONTINUED | OUTPATIENT
Start: 2020-12-04 | End: 2020-12-04

## 2020-12-04 RX ORDER — DIPHENHYDRAMINE HYDROCHLORIDE 50 MG/ML
12.5 INJECTION INTRAMUSCULAR; INTRAVENOUS
Status: DISCONTINUED | OUTPATIENT
Start: 2020-12-04 | End: 2020-12-04 | Stop reason: HOSPADM

## 2020-12-04 RX ORDER — FAMOTIDINE 10 MG/ML
20 INJECTION INTRAVENOUS ONCE
Status: COMPLETED | OUTPATIENT
Start: 2020-12-04 | End: 2020-12-04

## 2020-12-04 RX ORDER — ONDANSETRON 2 MG/ML
INJECTION INTRAMUSCULAR; INTRAVENOUS
Status: DISCONTINUED
Start: 2020-12-04 | End: 2020-12-04 | Stop reason: HOSPADM

## 2020-12-04 RX ORDER — MEPERIDINE HYDROCHLORIDE 50 MG/ML
INJECTION INTRAMUSCULAR; INTRAVENOUS; SUBCUTANEOUS
Status: DISCONTINUED | OUTPATIENT
Start: 2020-12-04 | End: 2020-12-04

## 2020-12-04 RX ORDER — MORPHINE SULFATE 4 MG/ML
2 INJECTION, SOLUTION INTRAMUSCULAR; INTRAVENOUS EVERY 5 MIN PRN
Status: DISCONTINUED | OUTPATIENT
Start: 2020-12-04 | End: 2020-12-04 | Stop reason: HOSPADM

## 2020-12-04 RX ADMIN — FAMOTIDINE 20 MG: 10 INJECTION INTRAVENOUS at 08:12

## 2020-12-04 RX ADMIN — ONDANSETRON HYDROCHLORIDE 4 MG: 2 SOLUTION INTRAMUSCULAR; INTRAVENOUS at 10:12

## 2020-12-04 RX ADMIN — SODIUM CHLORIDE, POTASSIUM CHLORIDE, SODIUM LACTATE AND CALCIUM CHLORIDE: 600; 310; 30; 20 INJECTION, SOLUTION INTRAVENOUS at 09:12

## 2020-12-04 RX ADMIN — EPHEDRINE SULFATE 25 MG: 50 INJECTION INTRAVENOUS at 10:12

## 2020-12-04 RX ADMIN — ONDANSETRON 4 MG: 2 INJECTION INTRAMUSCULAR; INTRAVENOUS at 09:12

## 2020-12-04 RX ADMIN — MEPERIDINE HYDROCHLORIDE 50 MG: 50 INJECTION INTRAMUSCULAR; INTRAVENOUS; SUBCUTANEOUS at 09:12

## 2020-12-04 RX ADMIN — CEFAZOLIN SODIUM 2 G: 2 SOLUTION INTRAVENOUS at 09:12

## 2020-12-04 RX ADMIN — SODIUM CHLORIDE, SODIUM LACTATE, POTASSIUM CHLORIDE, AND CALCIUM CHLORIDE: .6; .31; .03; .02 INJECTION, SOLUTION INTRAVENOUS at 08:12

## 2020-12-04 RX ADMIN — MIDAZOLAM HYDROCHLORIDE 2 MG: 1 INJECTION, SOLUTION INTRAMUSCULAR; INTRAVENOUS at 09:12

## 2020-12-04 RX ADMIN — LIDOCAINE HYDROCHLORIDE 50 MG: 20 INJECTION, SOLUTION EPIDURAL; INFILTRATION; INTRACAUDAL; PERINEURAL at 09:12

## 2020-12-04 RX ADMIN — PROPOFOL 200 MG: 10 INJECTION, EMULSION INTRAVENOUS at 09:12

## 2020-12-04 NOTE — OP NOTE
[facility] - Periop Services  Operative Note     SUMMARY     Surgery Date: 12/4/2020       Pre-op Diagnosis:  Hammer toe of right foot [M20.41]    Post-op Diagnosis:  Post-Op Diagnosis Codes:     * Hammer toe of right foot [M20.41]    Procedure(s) (LRB):  CORRECTION, HAMMER TOE 3rd toe [place] screws (Right)  Correction hammertoe 3rd digit right procedure code 64437  Surgeon(s) and Role:     * Matheus Chauhan DPM - Primary      Estimated Blood Loss:  Less than 5 cc  Hemostasis:  Ankle tourniquet placed at 250 mm of mercury for a period of 22 min    Anesthesia:  LMA in conjunction with local infiltrate equaling 20 cc 1% lidocaine plain  Injectables 30 cc of 0.5% Marcaine plain  Materials sterile irrigant 4.0 Vicryl 4.0 Monocryl paragon 28 headless compression screw 2.0 x 30 mm  Pathology none  Condition stable  Complications none    Description of the findings of the procedure:  Hammer toe of right foot [M20.41]         Specimens (From admission, onward)    None           Procedure:  Patient was taken the operating room placed in supine position on the OR table attention was then directed towards the patient's right ankle where Webril cast padding was placed on the patient's right ankle as was an ankle tourniquet.  Following this patient was locally anesthetized in a regional block of the 3rd ray of the patient's right foot patient's foot was then prepped and draped in the usual sterile manner patient's foot was exsanguinated utilizing an Esmarch bandage and the ankle tourniquet was raised to 250 mm of mercury.  Following this intraoperative fluoroscopy was used to plan the incision overlying the dorsal aspect of the 3rd digit right a longitudinal linear incision was created carried down to the level of the extensor tendon which was then transected medial to lateral and reflected off of the head of the proximal phalanx the head of the proximal phalanx was then resected utilizing a sagittal  saw a minimal amount of the head of the proximal phalanx was taken so as not to overly shorten the digit the articular cartilage on the base of the middle phalanx was also resected to perform a fusion at the proximal interphalangeal joint.  Following a proper amount of bone resection as confirmed under intraoperative fluoroscopy a paragon 28 headless compression screw was placed per manufacture guidelines across both the distal and proximal interphalangeal joint properly aligning the 3rd digit after having done this the screw was further advanced there was excessive tightness of the screw due to the patient's very good bone quality and hard bone there was some torquing of the screw noted on intraoperative fluoroscopy but the screw did not fracture it was felt that removing the screw and inserting a larger screw would not be appropriate and since the screw head not failed although it did show some torquing it was maintaining proper alignment which would facilitate healing around the screw and fusion of the proximal interphalangeal joint.  I felt removal of the screw could further damage the area of surgical correction or could cause fracturing of the screw leaving a portion of screw in the bone which would be more traumatic to try to have to remove especially because the patient has good anatomic alignment and correction of the previously noted contracted digit K-wire was removed the area was thoroughly flushed irrigated with copious amounts of sterile saline 4.0 Vicryl was used in a simple interrupted fashion to repair the extensor tendon via simple interrupted sutures and 4.0 Monocryl was used in a simple interrupted fashion to repair the skin layers of the incision site overlying the distal and dorsal aspect of the 3rd digit.  Ankle tourniquet was lowered removed no significant bleeding noted patient was injected with an additional 30 cc of 0.5% Marcaine plain Adaptic nonadhesive dressing sterile 4x4s ABD and  conform dressing were placed over the area as was a lightly applied Ace bandage patient was then placed in a fracture boot where she will be allowed to bear weight with minimal weight-bearing status for the next 7 days.  Patient left the operating room vital signs stable and vascular status having return normal preoperative levels.  It is believed this torquing of the screw was likely result of the patient's very hard bone and the nature of the very small long screw that was used however it was not fractured at the time of placement and again it felt that it would be more traumatic and more problematic to remove the screw because of the narrow proximal phalanx a larger screw would not be feasible to insert.  Patient left the operating with vital signs stable and vascular status having return to her normal preoperative levels.This note was created using M*Modal voice recognition software that occasionally misinterpreted phrases or words.

## 2020-12-04 NOTE — DISCHARGE INSTRUCTIONS

## 2020-12-04 NOTE — ANESTHESIA POSTPROCEDURE EVALUATION
Anesthesia Post Evaluation    Patient: Penelope Portillo    Procedure(s) Performed: Procedure(s) (LRB):  CORRECTION, HAMMER TOE 3rd toe [place] screws (Right)    Final Anesthesia Type: general    Patient location during evaluation: PACU  Patient participation: Yes- Able to Participate  Level of consciousness: awake and alert  Post-procedure vital signs: reviewed and stable  Pain management: adequate  Airway patency: patent    PONV status at discharge: No PONV  Anesthetic complications: no      Cardiovascular status: blood pressure returned to baseline  Respiratory status: unassisted  Hydration status: euvolemic  Follow-up not needed.          Vitals Value Taken Time   /90 12/04/20 1111   Temp 36.6 °C (97.8 °F) 12/04/20 1038   Pulse 90 12/04/20 1113   Resp 12 12/04/20 1113   SpO2 100 % 12/04/20 1113   Vitals shown include unvalidated device data.      Event Time   Out of Recovery 10:55:00         Pain/Aileen Score: Aileen Score: 10 (12/4/2020 11:10 AM)

## 2020-12-04 NOTE — PLAN OF CARE
PT TO RECOVERY FROM OR , PT CONNECTED TO MONITOR, IV INTACT,AND INFUSING, RIGHT FOOT  DRESSING NOTED IN POST OP BOOT, DRESSING CLEAN, DRY, INTACT, ELEVATED ON PILLOW I,CE APPLIED  ,VITALS STABLE, WILL CONTINUE TO MONITOR

## 2020-12-04 NOTE — INTERVAL H&P NOTE
The patient has been examined and the H&P has been reviewed:    I concur with the findings and no changes have occurred since H&P was written.    Anesthesia/Surgery risks, benefits and alternative options discussed and understood by patient/family.          Active Hospital Problems    Diagnosis  POA    Hammer toe of right foot [M20.41]  Yes      Resolved Hospital Problems   No resolved problems to display.

## 2020-12-04 NOTE — TRANSFER OF CARE
"Anesthesia Transfer of Care Note    Patient: Penelope Portillo    Procedure(s) Performed: Procedure(s) (LRB):  CORRECTION, HAMMER TOE 3rd toe [place] screws (Right)    Patient location: PACU    Anesthesia Type: general    Transport from OR: Transported from OR on room air with adequate spontaneous ventilation    Post pain: adequate analgesia    Post assessment: no apparent anesthetic complications    Post vital signs: stable    Level of consciousness: sedated and responds to stimulation    Nausea/Vomiting: no nausea/vomiting    Complications: none    Transfer of care protocol was followed      Last vitals:   Visit Vitals  BP (!) 120/92   Pulse 92   Temp 36.2 °C (97.1 °F) (Oral)   Resp 18   Ht 5' 3" (1.6 m)   LMP 11/04/2020 (Exact Date)   SpO2 100%   Breastfeeding No   BMI 40.21 kg/m²     "

## 2020-12-04 NOTE — BRIEF OP NOTE
[facility] - Periop Services  Brief Operative Note    Surgery Date: 12/4/2020     Surgeon(s) and Role:     * Matheus Chauhan DPM - Primary    Assisting Surgeon: None    Pre-op Diagnosis:  Hammer toe of right foot [M20.41]    Post-op Diagnosis:  Post-Op Diagnosis Codes:     * Hammer toe of right foot [M20.41]    Procedure(s) (LRB):  CORRECTION, HAMMER TOE 3rd toe [place] screws (Right)    Anesthesia: Choice    Description of the findings of the procedure(s):     Estimated Blood Loss: * No values recorded between 12/4/2020  9:50 AM and 12/4/2020 10:36 AM *         Specimens:   Specimen (12h ago, onward)    None            Discharge Note    OUTCOME: Patient tolerated treatment/procedure well without complication and is now ready for discharge.    DISPOSITION: Home or Self Care    FINAL DIAGNOSIS:  Hammer toe of right foot    FOLLOWUP: In clinic    DISCHARGE INSTRUCTIONS:    Discharge Procedure Orders   Lifting restrictions     Ice to affected area     Keep surgical extremity elevated     Weight bearing restrictions (specify):

## 2020-12-07 ENCOUNTER — OFFICE VISIT (OUTPATIENT)
Dept: PODIATRY | Facility: CLINIC | Age: 35
End: 2020-12-07
Payer: COMMERCIAL

## 2020-12-07 VITALS
BODY MASS INDEX: 40.22 KG/M2 | HEART RATE: 87 BPM | HEIGHT: 63 IN | SYSTOLIC BLOOD PRESSURE: 137 MMHG | TEMPERATURE: 98 F | DIASTOLIC BLOOD PRESSURE: 82 MMHG | WEIGHT: 227 LBS

## 2020-12-07 DIAGNOSIS — M20.41 HAMMER TOE OF RIGHT FOOT: Primary | ICD-10-CM

## 2020-12-07 PROCEDURE — 99999 PR PBB SHADOW E&M-EST. PATIENT-LVL IV: ICD-10-PCS | Mod: PBBFAC,,, | Performed by: PODIATRIST

## 2020-12-07 PROCEDURE — 3008F BODY MASS INDEX DOCD: CPT | Mod: S$GLB,,, | Performed by: PODIATRIST

## 2020-12-07 PROCEDURE — 3008F PR BODY MASS INDEX (BMI) DOCUMENTED: ICD-10-PCS | Mod: S$GLB,,, | Performed by: PODIATRIST

## 2020-12-07 PROCEDURE — 1125F PR PAIN SEVERITY QUANTIFIED, PAIN PRESENT: ICD-10-PCS | Mod: S$GLB,,, | Performed by: PODIATRIST

## 2020-12-07 PROCEDURE — 99024 POSTOP FOLLOW-UP VISIT: CPT | Mod: S$GLB,,, | Performed by: PODIATRIST

## 2020-12-07 PROCEDURE — 1125F AMNT PAIN NOTED PAIN PRSNT: CPT | Mod: S$GLB,,, | Performed by: PODIATRIST

## 2020-12-07 PROCEDURE — 99024 PR POST-OP FOLLOW-UP VISIT: ICD-10-PCS | Mod: S$GLB,,, | Performed by: PODIATRIST

## 2020-12-07 PROCEDURE — 99999 PR PBB SHADOW E&M-EST. PATIENT-LVL IV: CPT | Mod: PBBFAC,,, | Performed by: PODIATRIST

## 2020-12-08 NOTE — PROGRESS NOTES
"Penelope Portillo is a 35 y.o. female patient.   1. Hammer toe of right foot      Past Medical History:   Diagnosis Date    Anemia     Asthma     Hypertension      No past surgical history pertinent negatives on file.  Scheduled Meds:  Continuous Infusions:  PRN Meds:    Review of patient's allergies indicates:  No Known Allergies  There are no hospital problems to display for this patient.    Blood pressure 137/82, pulse 87, temperature 98 °F (36.7 °C), height 5' 3" (1.6 m), weight 103 kg (227 lb).                    Subjective  Objective   Assessment & Plan     Patient presents status post correction 3rd digit right.  Patient states she is having a good bit of pain but it is slowly getting better she is tolerating her Bactrim well and taking it as directed.  Patient has been bearing weight in the fracture boot but states she has been doing a lot of icing and elevating patient is currently afebrile and in no acute distress.  On evaluation the patient's dressing was removed the 3rd digit looks very good there is actually less swelling and inflammation than expected with this type of procedure obviously the patient has been doing a good job keeping the area well protected icing and elevating as directed.  A new well-padded dressing was applied to the patient's right foot she was put back in the fracture boot she understands she can bear weight as tolerated in the fracture boot she does need to continue to ice and elevate to control inflammation I do plan to physically her for follow-up in 2 weeks she is to keep the dressing dry and intact if she has any problems questions or concerns prior to that time she is to contact us immediately.  Patient understands she is going to be in a boot for most likely 3 weeks but possibly up to 4 weeks and the more we control the swelling the faster she will be able to get back into a regular shoe she is to keep the dressing dry and intact and she is not to get her right foot wet.  " Follow-up 2 weeks.This note was created using ecobee voice recognition software that occasionally misinterpreted phrases or words.  Matheus Chauhan DPM  12/8/2020

## 2020-12-21 ENCOUNTER — HOSPITAL ENCOUNTER (OUTPATIENT)
Dept: RADIOLOGY | Facility: HOSPITAL | Age: 35
Discharge: HOME | End: 2020-12-21
Attending: PODIATRIST
Payer: COMMERCIAL

## 2020-12-21 ENCOUNTER — OFFICE VISIT (OUTPATIENT)
Dept: PODIATRY | Facility: CLINIC | Age: 35
End: 2020-12-21
Payer: COMMERCIAL

## 2020-12-21 VITALS
BODY MASS INDEX: 40.22 KG/M2 | RESPIRATION RATE: 15 BRPM | HEART RATE: 101 BPM | OXYGEN SATURATION: 99 % | WEIGHT: 227 LBS | SYSTOLIC BLOOD PRESSURE: 142 MMHG | HEIGHT: 63 IN | TEMPERATURE: 98 F | DIASTOLIC BLOOD PRESSURE: 102 MMHG

## 2020-12-21 DIAGNOSIS — M20.41 HAMMER TOE OF RIGHT FOOT: ICD-10-CM

## 2020-12-21 DIAGNOSIS — M20.41 HAMMER TOE OF RIGHT FOOT: Primary | ICD-10-CM

## 2020-12-21 PROCEDURE — 99999 PR PBB SHADOW E&M-EST. PATIENT-LVL V: CPT | Mod: PBBFAC,,, | Performed by: PODIATRIST

## 2020-12-21 PROCEDURE — 1126F AMNT PAIN NOTED NONE PRSNT: CPT | Mod: S$GLB,,, | Performed by: PODIATRIST

## 2020-12-21 PROCEDURE — 99999 PR PBB SHADOW E&M-EST. PATIENT-LVL V: ICD-10-PCS | Mod: PBBFAC,,, | Performed by: PODIATRIST

## 2020-12-21 PROCEDURE — 1126F PR PAIN SEVERITY QUANTIFIED, NO PAIN PRESENT: ICD-10-PCS | Mod: S$GLB,,, | Performed by: PODIATRIST

## 2020-12-21 PROCEDURE — 73630 XR FOOT COMPLETE 3 VIEW RIGHT: ICD-10-PCS | Mod: 26,RT,, | Performed by: RADIOLOGY

## 2020-12-21 PROCEDURE — 99024 POSTOP FOLLOW-UP VISIT: CPT | Mod: S$GLB,,, | Performed by: PODIATRIST

## 2020-12-21 PROCEDURE — 73630 X-RAY EXAM OF FOOT: CPT | Mod: TC,FY,RT

## 2020-12-21 PROCEDURE — 3008F BODY MASS INDEX DOCD: CPT | Mod: S$GLB,,, | Performed by: PODIATRIST

## 2020-12-21 PROCEDURE — 3008F PR BODY MASS INDEX (BMI) DOCUMENTED: ICD-10-PCS | Mod: S$GLB,,, | Performed by: PODIATRIST

## 2020-12-21 PROCEDURE — 73630 X-RAY EXAM OF FOOT: CPT | Mod: 26,RT,, | Performed by: RADIOLOGY

## 2020-12-21 PROCEDURE — 99024 PR POST-OP FOLLOW-UP VISIT: ICD-10-PCS | Mod: S$GLB,,, | Performed by: PODIATRIST

## 2020-12-22 ENCOUNTER — TELEPHONE (OUTPATIENT)
Dept: PODIATRY | Facility: CLINIC | Age: 35
End: 2020-12-22

## 2020-12-22 RX ORDER — DICLOFENAC SODIUM 75 MG/1
75 TABLET, DELAYED RELEASE ORAL 2 TIMES DAILY
Qty: 60 TABLET | Refills: 1 | Status: SHIPPED | OUTPATIENT
Start: 2020-12-22 | End: 2021-01-21

## 2020-12-22 NOTE — TELEPHONE ENCOUNTER
----- Message from Padma Adames sent at 12/22/2020  8:51 AM CST -----  Contact: pt  Type: Needs Medical Advice  Who Called:  patient      Pharmacy name and phone #:   [facility] [address], [place] [id number];  Best Call Back Number: #[phone]  Additional Information: Patient called she was seen in the office the medication has not been called into her pharmacy . The patient just called her pharmacy.

## 2020-12-22 NOTE — TELEPHONE ENCOUNTER
----- Message from Jaguar Castillo sent at 12/22/2020 11:47 AM CST -----  Regarding: return call  Contact: self  Type:  Patient Returning Call    Who Called:  self  Who Left Message for Patient:  Alicia  Does the patient know what this is regarding?:   Best Call Back Number:  [phone]  Additional Information:

## 2020-12-22 NOTE — TELEPHONE ENCOUNTER
VALERIANO for patient I was returning her call. When she calls back leave the name of the medication she was needing in the message if I am busy and I will get the message to Dr Chauhan

## 2020-12-29 NOTE — PROGRESS NOTES
"Penelope Portillo is a 35 y.o. female patient.   1. Hammer toe of right foot      Past Medical History:   Diagnosis Date    Anemia     Asthma     Hypertension      No past surgical history pertinent negatives on file.  Scheduled Meds:  Continuous Infusions:  PRN Meds:    Review of patient's allergies indicates:  No Known Allergies  There are no hospital problems to display for this patient.    Blood pressure (!) 142/102, pulse 101, temperature 98.2 °F (36.8 °C), temperature source Temporal, resp. rate 15, height 5' 3" (1.6 m), weight 103 kg (227 lb), SpO2 99 %.                                Subjective  Objective:  Vital signs (most recent): Blood pressure (!) 142/102, pulse 101, temperature 98.2 °F (36.8 °C), temperature source Temporal, resp. rate 15, height 5' 3" (1.6 m), weight 103 kg (227 lb), SpO2 99 %.     Assessment & Plan     Patient presents status post correction 3rd digit right.  Patient is doing very well she is having less pain and discomfort I am going to allow her to transition out of her boot into a normal shoe as tolerated she is scheduled to go back to work on December 31st I see no reason why she cannot do this she can expect to have some discomfort some swelling this is normal as she increases activity her x-rays roll were reviewed today everything is healing very well on the 3rd digit right foot I did review these with the patient.  Patient is going to start taking diclofenac twice a day as directed for inflammation.  Patient has been getting the area wet this sutures are slowly dissolving I plan to follow up with the patient in 3 weeks I have recommended ice and elevation as necessary.  Patient is progressing very well in her postoperative recovery.  This note was created using [employer] voice recognition software that occasionally misinterpreted phrases or words.  Matheus Chauhan DPM  12/28/2020    "

## 2021-01-11 ENCOUNTER — OFFICE VISIT (OUTPATIENT)
Dept: PODIATRY | Facility: CLINIC | Age: 36
End: 2021-01-11
Payer: COMMERCIAL

## 2021-01-11 VITALS
HEART RATE: 99 BPM | OXYGEN SATURATION: 100 % | BODY MASS INDEX: 40.22 KG/M2 | WEIGHT: 227 LBS | SYSTOLIC BLOOD PRESSURE: 144 MMHG | DIASTOLIC BLOOD PRESSURE: 97 MMHG | TEMPERATURE: 98 F | HEIGHT: 63 IN

## 2021-01-11 DIAGNOSIS — M20.41 HAMMER TOE OF RIGHT FOOT: Primary | ICD-10-CM

## 2021-01-11 PROCEDURE — 1125F AMNT PAIN NOTED PAIN PRSNT: CPT | Mod: S$GLB,,, | Performed by: PODIATRIST

## 2021-01-11 PROCEDURE — 3008F PR BODY MASS INDEX (BMI) DOCUMENTED: ICD-10-PCS | Mod: S$GLB,,, | Performed by: PODIATRIST

## 2021-01-11 PROCEDURE — 99024 POSTOP FOLLOW-UP VISIT: CPT | Mod: S$GLB,,, | Performed by: PODIATRIST

## 2021-01-11 PROCEDURE — 1125F PR PAIN SEVERITY QUANTIFIED, PAIN PRESENT: ICD-10-PCS | Mod: S$GLB,,, | Performed by: PODIATRIST

## 2021-01-11 PROCEDURE — 99999 PR PBB SHADOW E&M-EST. PATIENT-LVL IV: ICD-10-PCS | Mod: PBBFAC,,, | Performed by: PODIATRIST

## 2021-01-11 PROCEDURE — 99999 PR PBB SHADOW E&M-EST. PATIENT-LVL IV: CPT | Mod: PBBFAC,,, | Performed by: PODIATRIST

## 2021-01-11 PROCEDURE — 99024 PR POST-OP FOLLOW-UP VISIT: ICD-10-PCS | Mod: S$GLB,,, | Performed by: PODIATRIST

## 2021-01-11 PROCEDURE — 3008F BODY MASS INDEX DOCD: CPT | Mod: S$GLB,,, | Performed by: PODIATRIST

## 2022-03-11 NOTE — ADDENDUM NOTE
Addended by: HARI WEATHERS on: 12/2/2020 07:55 AM     Modules accepted: Cem Littlejohn    
electronic

## (undated) DEVICE — SEE MEDLINE ITEM 146298

## (undated) DEVICE — BLADE SURG #15 CARBON STEEL

## (undated) DEVICE — KIT SURGI-START 7695-SLA CUSTM

## (undated) DEVICE — NDL ECLIPSE SAFETY 18GX1-1/2IN

## (undated) DEVICE — ELECTRODE REM PLYHSV RETURN 9

## (undated) DEVICE — SUT 4-0 VICRYL / SH

## (undated) DEVICE — TOURNIQUET SB QC SP 18X4IN

## (undated) DEVICE — NDL ECLIPSE SAFETY 25GX1IN

## (undated) DEVICE — SEE MEDLINE ITEM 152522

## (undated) DEVICE — SYR B-D DISP CONTROL 10CC100/C

## (undated) DEVICE — PAD PREPS ALCOHOL 2-PLY LARGE

## (undated) DEVICE — SUT MONOCRYL 4-0 PS-2

## (undated) DEVICE — BLADE SAW MED NAR 18.0X5.5MM

## (undated) DEVICE — DRAPE MINI C-ARM

## (undated) DEVICE — SEE MEDLINE ITEM 157116

## (undated) DEVICE — CANISTER SUCTION 3000CC

## (undated) DEVICE — GOWN B1 X-LG X-LONG

## (undated) DEVICE — GLOVE SURGEONS ULTRA TOUCH 6.5

## (undated) DEVICE — SOL 9P NACL IRR PIC IL

## (undated) DEVICE — DRAPE STERI INSTRUMENT 1018

## (undated) DEVICE — GLOVE BIOGEL PI ORTHO PRO 7.5

## (undated) DEVICE — GLOVE BIOGEL ORTHOPEDIC 6.5

## (undated) DEVICE — GAUZE SPONGE 4X4 12PLY

## (undated) DEVICE — COVER EQUIP 36X12 W/ELSTC BAND

## (undated) DEVICE — SEE MEDLINE ITEM 146270

## (undated) DEVICE — DRESSING N ADH OIL EMUL 3X3